# Patient Record
Sex: FEMALE | Race: BLACK OR AFRICAN AMERICAN | URBAN - METROPOLITAN AREA
[De-identification: names, ages, dates, MRNs, and addresses within clinical notes are randomized per-mention and may not be internally consistent; named-entity substitution may affect disease eponyms.]

---

## 2017-11-28 PROBLEM — 266435005 GASTRO-ESOPHAGEAL REFLUX DISEASE WITHOUT ESOPHAGITIS: Status: ACTIVE | Noted: 2017-11-28

## 2020-10-13 ENCOUNTER — OFFICE VISIT (OUTPATIENT)
Dept: URBAN - METROPOLITAN AREA CLINIC 29 | Facility: CLINIC | Age: 55
End: 2020-10-13

## 2020-12-01 ENCOUNTER — OFFICE VISIT (OUTPATIENT)
Dept: URBAN - METROPOLITAN AREA CLINIC 29 | Facility: CLINIC | Age: 55
End: 2020-12-01

## 2020-12-01 PROBLEM — 88111009 CHANGE IN BOWEL HABIT: Status: ACTIVE | Noted: 2020-12-01

## 2020-12-01 PROBLEM — 238131007 OVERWEIGHT: Status: ACTIVE | Noted: 2020-12-01

## 2020-12-01 PROBLEM — 14760008 CONSTIPATION: Status: ACTIVE | Noted: 2020-12-01

## 2020-12-18 ENCOUNTER — OFFICE VISIT (OUTPATIENT)
Dept: URBAN - METROPOLITAN AREA SURGERY CENTER 7 | Facility: SURGERY CENTER | Age: 55
End: 2020-12-18

## 2021-02-08 ENCOUNTER — OFFICE VISIT (OUTPATIENT)
Dept: URBAN - METROPOLITAN AREA MEDICAL CENTER 8 | Facility: MEDICAL CENTER | Age: 56
End: 2021-02-08

## 2022-04-30 ENCOUNTER — TELEPHONE ENCOUNTER (OUTPATIENT)
Dept: URBAN - METROPOLITAN AREA CLINIC 121 | Facility: CLINIC | Age: 57
End: 2022-04-30

## 2022-04-30 RX ORDER — LEVOTHYROXINE SODIUM 75 UG/1
TABLET ORAL
OUTPATIENT
Start: 2016-11-09

## 2022-04-30 RX ORDER — LEVOTHYROXINE SODIUM 75 UG/1
TABLET ORAL
OUTPATIENT
Start: 2016-11-09 | End: 2020-12-01

## 2022-05-01 ENCOUNTER — TELEPHONE ENCOUNTER (OUTPATIENT)
Dept: URBAN - METROPOLITAN AREA CLINIC 121 | Facility: CLINIC | Age: 57
End: 2022-05-01

## 2022-05-01 RX ORDER — ERGOCALCIFEROL (VITAMIN D2) 10 MCG
TABLET ORAL
Status: ACTIVE | COMMUNITY
Start: 2016-11-09

## 2022-05-01 RX ORDER — LEVOTHYROXINE SODIUM 88 UG/1
TABLET ORAL
Status: ACTIVE | COMMUNITY
Start: 2020-12-01

## 2022-05-01 RX ORDER — SODIUM SULFATE, POTASSIUM SULFATE, MAGNESIUM SULFATE 17.5; 3.13; 1.6 G/ML; G/ML; G/ML
MIX AND USE AS DIRECTED. MAY SUB FOR GOLYTELY SOLUTION, CONCENTRATE ORAL
Status: ACTIVE | COMMUNITY
Start: 2016-11-09

## 2022-05-01 RX ORDER — LANSOPRAZOLE 30 MG/1
TAKE 1CAPSULE PO TWICE A DAY CAPSULE, DELAYED RELEASE ORAL
Status: ACTIVE | COMMUNITY
Start: 2017-11-28

## 2022-05-01 RX ORDER — SODIUM SULFATE, POTASSIUM SULFATE, MAGNESIUM SULFATE 17.5; 3.13; 1.6 G/ML; G/ML; G/ML
MIX AND USE AS DIRECTED. MAY SUB FOR GOLYTELY SOLUTION, CONCENTRATE ORAL
Status: ACTIVE | COMMUNITY
Start: 2020-12-01

## 2022-05-01 RX ORDER — TRIAMTERENE AND HYDROCHLOROTHIAZIDE 37.5; 25 MG/1; MG/1
CAPSULE ORAL
Status: ACTIVE | COMMUNITY
Start: 2016-11-09

## 2022-05-01 RX ORDER — ATENOLOL 25 MG/1
TABLET ORAL
Status: ACTIVE | COMMUNITY
Start: 2016-11-09

## 2022-05-01 RX ORDER — PROPRANOLOL HYDROCHLORIDE 80 MG/1
TABLET ORAL
Status: ACTIVE | COMMUNITY
Start: 2020-12-01

## 2022-05-01 RX ORDER — ATORVASTATIN CALCIUM 20 MG/1
TABLET, FILM COATED ORAL
Status: ACTIVE | COMMUNITY
Start: 2016-11-09

## 2022-05-01 RX ORDER — LINACLOTIDE 290 UG/1
1 CAPSULE PO QAM 30 MINUTES BEFORE FIRST MEAL CAPSULE, GELATIN COATED ORAL
Status: ACTIVE | COMMUNITY
Start: 2020-12-23

## 2022-05-01 RX ORDER — GABAPENTIN 600 MG/1
TABLET ORAL
Status: ACTIVE | COMMUNITY
Start: 2020-12-01

## 2022-05-01 RX ORDER — METFORMIN HYDROCHLORIDE 500 MG/1
TABLET, EXTENDED RELEASE ORAL
Status: ACTIVE | COMMUNITY
Start: 2016-11-09

## 2022-05-01 RX ORDER — TRIAMTERENE AND HYDROCHLOROTHIAZIDE 37.5; 25 MG/1; MG/1
TABLET ORAL
Status: ACTIVE | COMMUNITY
Start: 2020-12-01

## 2023-01-19 ENCOUNTER — OFFICE VISIT (OUTPATIENT)
Dept: URBAN - METROPOLITAN AREA CLINIC 29 | Facility: CLINIC | Age: 58
End: 2023-01-19
Payer: COMMERCIAL

## 2023-01-19 ENCOUNTER — DASHBOARD ENCOUNTERS (OUTPATIENT)
Age: 58
End: 2023-01-19

## 2023-01-19 ENCOUNTER — WEB ENCOUNTER (OUTPATIENT)
Dept: URBAN - METROPOLITAN AREA CLINIC 29 | Facility: CLINIC | Age: 58
End: 2023-01-19

## 2023-01-19 VITALS
DIASTOLIC BLOOD PRESSURE: 90 MMHG | SYSTOLIC BLOOD PRESSURE: 136 MMHG | HEIGHT: 66 IN | BODY MASS INDEX: 26.9 KG/M2 | WEIGHT: 167.4 LBS | HEART RATE: 70 BPM

## 2023-01-19 DIAGNOSIS — K64.4 ANAL SKIN TAG: ICD-10-CM

## 2023-01-19 PROCEDURE — 99213 OFFICE O/P EST LOW 20 MIN: CPT | Performed by: INTERNAL MEDICINE

## 2023-01-19 RX ORDER — PROPRANOLOL HYDROCHLORIDE 80 MG/1
TABLET ORAL
Status: ACTIVE | COMMUNITY
Start: 2020-12-01

## 2023-01-19 RX ORDER — SODIUM SULFATE, POTASSIUM SULFATE, MAGNESIUM SULFATE 17.5; 3.13; 1.6 G/ML; G/ML; G/ML
MIX AND USE AS DIRECTED. MAY SUB FOR GOLYTELY SOLUTION, CONCENTRATE ORAL
Status: ACTIVE | COMMUNITY
Start: 2016-11-09

## 2023-01-19 RX ORDER — ERGOCALCIFEROL (VITAMIN D2) 10 MCG
TABLET ORAL
Status: ACTIVE | COMMUNITY
Start: 2016-11-09

## 2023-01-19 RX ORDER — ATENOLOL 25 MG/1
TABLET ORAL
Status: ACTIVE | COMMUNITY
Start: 2016-11-09

## 2023-01-19 RX ORDER — ATORVASTATIN CALCIUM 20 MG/1
TABLET, FILM COATED ORAL
Status: ACTIVE | COMMUNITY
Start: 2016-11-09

## 2023-01-19 RX ORDER — METFORMIN HYDROCHLORIDE 500 MG/1
TABLET, EXTENDED RELEASE ORAL
Status: ACTIVE | COMMUNITY
Start: 2016-11-09

## 2023-01-19 RX ORDER — TRIAMTERENE AND HYDROCHLOROTHIAZIDE 37.5; 25 MG/1; MG/1
CAPSULE ORAL
Status: ACTIVE | COMMUNITY
Start: 2016-11-09

## 2023-01-19 RX ORDER — PHENTERMINE HYDROCHLORIDE 37.5 MG/1
CAPSULE ORAL
Qty: 0 | Refills: 0 | Status: ACTIVE | COMMUNITY
Start: 1900-01-01 | End: 1900-01-01

## 2023-01-19 RX ORDER — GABAPENTIN 600 MG/1
TABLET ORAL
Status: ACTIVE | COMMUNITY
Start: 2020-12-01

## 2023-01-19 RX ORDER — TRIAMTERENE AND HYDROCHLOROTHIAZIDE 37.5; 25 MG/1; MG/1
TABLET ORAL
Status: ACTIVE | COMMUNITY
Start: 2020-12-01

## 2023-01-19 RX ORDER — LEVOTHYROXINE SODIUM 88 UG/1
TABLET ORAL
Status: ACTIVE | COMMUNITY
Start: 2020-12-01

## 2023-01-19 RX ORDER — ERGOCALCIFEROL 1.25 MG/1
CAPSULE ORAL
Qty: 0 | Refills: 0 | Status: ACTIVE | COMMUNITY
Start: 1900-01-01 | End: 1900-01-01

## 2023-01-19 RX ORDER — METFORMIN HYDROCHLORIDE 500 MG/1
TABLET, COATED ORAL
Qty: 0 | Refills: 0 | Status: ACTIVE | COMMUNITY
Start: 1900-01-01 | End: 1900-01-01

## 2023-01-19 RX ORDER — LANSOPRAZOLE 30 MG/1
TAKE 1CAPSULE PO TWICE A DAY CAPSULE, DELAYED RELEASE ORAL
Status: ACTIVE | COMMUNITY
Start: 2017-11-28

## 2023-01-19 RX ORDER — LINACLOTIDE 290 UG/1
1 CAPSULE PO QAM 30 MINUTES BEFORE FIRST MEAL CAPSULE, GELATIN COATED ORAL
Status: ACTIVE | COMMUNITY
Start: 2020-12-23

## 2023-01-19 NOTE — HPI-TODAY'S VISIT:
Mrs. Rose presents today with complaint of rectal bleeding and perirectal irritation over the past month.  She has tried some topical hemorrhoid creams with no improvement.  Last colonoscopy in February 2021 was only notable for anal skin tags.  Otherwise, she has no other GI complaints.

## 2024-11-08 ENCOUNTER — OFFICE VISIT (OUTPATIENT)
Dept: URBAN - METROPOLITAN AREA CLINIC 29 | Facility: CLINIC | Age: 59
End: 2024-11-08
Payer: COMMERCIAL

## 2024-11-08 VITALS
SYSTOLIC BLOOD PRESSURE: 107 MMHG | BODY MASS INDEX: 23.46 KG/M2 | WEIGHT: 146 LBS | HEIGHT: 66 IN | DIASTOLIC BLOOD PRESSURE: 71 MMHG | HEART RATE: 72 BPM

## 2024-11-08 DIAGNOSIS — E11.9 TYPE 2 DIABETES MELLITUS WITHOUT COMPLICATIONS: ICD-10-CM

## 2024-11-08 DIAGNOSIS — I10 ESSENTIAL (PRIMARY) HYPERTENSION: ICD-10-CM

## 2024-11-08 DIAGNOSIS — K21.9 GASTRO-ESOPHAGEAL REFLUX DISEASE WITHOUT ESOPHAGITIS: ICD-10-CM

## 2024-11-08 PROCEDURE — 99214 OFFICE O/P EST MOD 30 MIN: CPT | Performed by: INTERNAL MEDICINE

## 2024-11-08 RX ORDER — PROPRANOLOL HYDROCHLORIDE 80 MG/1
TABLET ORAL
Status: DISCONTINUED | COMMUNITY
Start: 2020-12-01

## 2024-11-08 RX ORDER — ATORVASTATIN CALCIUM 20 MG/1
TABLET, FILM COATED ORAL
Status: ACTIVE | COMMUNITY
Start: 2016-11-09

## 2024-11-08 RX ORDER — ATENOLOL 25 MG/1
TABLET ORAL
Status: DISCONTINUED | COMMUNITY
Start: 2016-11-09

## 2024-11-08 RX ORDER — LEVOTHYROXINE SODIUM 88 UG/1
TABLET ORAL
Status: ACTIVE | COMMUNITY
Start: 2020-12-01

## 2024-11-08 RX ORDER — VONOPRAZAN FUMARATE 26.72 MG/1
1 TABLET TABLET ORAL ONCE A DAY
Qty: 90 TABLET | Refills: 3 | OUTPATIENT
Start: 2024-11-09 | End: 2025-11-04

## 2024-11-08 RX ORDER — ERGOCALCIFEROL (VITAMIN D2) 10 MCG
TABLET ORAL
Status: ACTIVE | COMMUNITY
Start: 2016-11-09

## 2024-11-08 RX ORDER — SODIUM SULFATE, POTASSIUM SULFATE, MAGNESIUM SULFATE 17.5; 3.13; 1.6 G/ML; G/ML; G/ML
MIX AND USE AS DIRECTED. MAY SUB FOR GOLYTELY SOLUTION, CONCENTRATE ORAL
Status: DISCONTINUED | COMMUNITY
Start: 2016-11-09

## 2024-11-08 RX ORDER — GABAPENTIN 600 MG/1
TABLET ORAL
Status: ACTIVE | COMMUNITY
Start: 2020-12-01

## 2024-11-08 RX ORDER — PHENTERMINE HYDROCHLORIDE 37.5 MG/1
CAPSULE ORAL
Qty: 0 | Refills: 0 | Status: DISCONTINUED | COMMUNITY
Start: 1900-01-01

## 2024-11-08 RX ORDER — METFORMIN HYDROCHLORIDE 500 MG/1
TABLET, EXTENDED RELEASE ORAL
Status: DISCONTINUED | COMMUNITY
Start: 2016-11-09

## 2024-11-08 RX ORDER — TRIAMTERENE AND HYDROCHLOROTHIAZIDE 37.5; 25 MG/1; MG/1
CAPSULE ORAL
Status: ACTIVE | COMMUNITY
Start: 2016-11-09

## 2024-11-08 RX ORDER — LANSOPRAZOLE 30 MG/1
TAKE 1CAPSULE PO TWICE A DAY CAPSULE, DELAYED RELEASE ORAL
Status: DISCONTINUED | COMMUNITY
Start: 2017-11-28

## 2024-11-08 RX ORDER — METFORMIN HYDROCHLORIDE 500 MG/1
TABLET, COATED ORAL
Qty: 0 | Refills: 0 | Status: DISCONTINUED | COMMUNITY
Start: 1900-01-01

## 2024-11-08 RX ORDER — ERGOCALCIFEROL 1.25 MG/1
CAPSULE ORAL
Qty: 0 | Refills: 0 | Status: ACTIVE | COMMUNITY
Start: 1900-01-01

## 2024-11-08 RX ORDER — TRIAMTERENE AND HYDROCHLOROTHIAZIDE 37.5; 25 MG/1; MG/1
TABLET ORAL
Status: ACTIVE | COMMUNITY
Start: 2020-12-01

## 2024-11-08 RX ORDER — LINACLOTIDE 290 UG/1
1 CAPSULE PO QAM 30 MINUTES BEFORE FIRST MEAL CAPSULE, GELATIN COATED ORAL
Status: DISCONTINUED | COMMUNITY
Start: 2020-12-23

## 2024-11-08 NOTE — HPI-TODAY'S VISIT:
Mrs. Rose presents today per referral by her otolaryngologist Dr. Shea for further management of heartburn. She was recently diagnosed her with laryngal pharyngeal reflux disease.  She had tried some prior PPIs, such as Prilosec with no improvement.  Otherwise, she has no other GI complaints.

## 2024-11-09 ENCOUNTER — LAB OUTSIDE AN ENCOUNTER (OUTPATIENT)
Dept: URBAN - METROPOLITAN AREA CLINIC 29 | Facility: CLINIC | Age: 59
End: 2024-11-09

## 2024-11-18 ENCOUNTER — OFFICE VISIT (OUTPATIENT)
Dept: URBAN - METROPOLITAN AREA SURGERY CENTER 7 | Facility: SURGERY CENTER | Age: 59
End: 2024-11-18
Payer: COMMERCIAL

## 2024-11-18 ENCOUNTER — CLAIMS CREATED FROM THE CLAIM WINDOW (OUTPATIENT)
Dept: URBAN - METROPOLITAN AREA SURGERY CENTER 7 | Facility: SURGERY CENTER | Age: 59
End: 2024-11-18

## 2024-11-18 ENCOUNTER — TELEPHONE ENCOUNTER (OUTPATIENT)
Dept: URBAN - METROPOLITAN AREA CLINIC 27 | Facility: CLINIC | Age: 59
End: 2024-11-18

## 2024-11-18 ENCOUNTER — CLAIMS CREATED FROM THE CLAIM WINDOW (OUTPATIENT)
Dept: URBAN - METROPOLITAN AREA CLINIC 4 | Facility: CLINIC | Age: 59
End: 2024-11-18
Payer: COMMERCIAL

## 2024-11-18 DIAGNOSIS — K21.9 ACID REFLUX: ICD-10-CM

## 2024-11-18 DIAGNOSIS — R12 HEARTBURN: ICD-10-CM

## 2024-11-18 DIAGNOSIS — K21.9 GASTRO-ESOPHAGEAL REFLUX DISEASE WITHOUT ESOPHAGITIS: ICD-10-CM

## 2024-11-18 PROCEDURE — 88305 TISSUE EXAM BY PATHOLOGIST: CPT | Performed by: PATHOLOGY

## 2024-11-18 PROCEDURE — 43239 EGD BIOPSY SINGLE/MULTIPLE: CPT | Performed by: INTERNAL MEDICINE

## 2024-11-18 PROCEDURE — 88312 SPECIAL STAINS GROUP 1: CPT | Performed by: PATHOLOGY

## 2024-11-18 RX ORDER — ATORVASTATIN CALCIUM 20 MG/1
TABLET, FILM COATED ORAL
Status: ACTIVE | COMMUNITY
Start: 2016-11-09

## 2024-11-18 RX ORDER — TRIAMTERENE AND HYDROCHLOROTHIAZIDE 37.5; 25 MG/1; MG/1
CAPSULE ORAL
Status: ACTIVE | COMMUNITY
Start: 2016-11-09

## 2024-11-18 RX ORDER — ERGOCALCIFEROL (VITAMIN D2) 10 MCG
TABLET ORAL
Status: ACTIVE | COMMUNITY
Start: 2016-11-09

## 2024-11-18 RX ORDER — VONOPRAZAN FUMARATE 26.72 MG/1
1 TABLET TABLET ORAL ONCE A DAY
Qty: 90 TABLET | Refills: 3 | Status: ACTIVE | COMMUNITY
Start: 2024-11-09 | End: 2025-11-04

## 2024-11-18 RX ORDER — PANTOPRAZOLE SODIUM 40 MG/1
1 TABLET TABLET, DELAYED RELEASE ORAL TWICE A DAY
Qty: 180 TABLET | Refills: 5 | OUTPATIENT
Start: 2024-11-18

## 2024-11-18 RX ORDER — LEVOTHYROXINE SODIUM 88 UG/1
TABLET ORAL
Status: ACTIVE | COMMUNITY
Start: 2020-12-01

## 2024-11-18 RX ORDER — GABAPENTIN 600 MG/1
TABLET ORAL
Status: ACTIVE | COMMUNITY
Start: 2020-12-01

## 2024-11-18 RX ORDER — TRIAMTERENE AND HYDROCHLOROTHIAZIDE 37.5; 25 MG/1; MG/1
TABLET ORAL
Status: ACTIVE | COMMUNITY
Start: 2020-12-01

## 2024-11-18 RX ORDER — ERGOCALCIFEROL 1.25 MG/1
CAPSULE ORAL
Qty: 0 | Refills: 0 | Status: ACTIVE | COMMUNITY
Start: 1900-01-01

## 2024-12-06 ENCOUNTER — OFFICE VISIT (OUTPATIENT)
Dept: URBAN - METROPOLITAN AREA CLINIC 29 | Facility: CLINIC | Age: 59
End: 2024-12-06
Payer: COMMERCIAL

## 2024-12-06 VITALS
BODY MASS INDEX: 23.78 KG/M2 | HEART RATE: 67 BPM | HEIGHT: 66 IN | WEIGHT: 148 LBS | SYSTOLIC BLOOD PRESSURE: 110 MMHG | DIASTOLIC BLOOD PRESSURE: 69 MMHG

## 2024-12-06 DIAGNOSIS — K21.9 GASTRO-ESOPHAGEAL REFLUX DISEASE WITHOUT ESOPHAGITIS: ICD-10-CM

## 2024-12-06 PROCEDURE — 99214 OFFICE O/P EST MOD 30 MIN: CPT | Performed by: INTERNAL MEDICINE

## 2024-12-06 RX ORDER — TRIAMTERENE AND HYDROCHLOROTHIAZIDE 37.5; 25 MG/1; MG/1
TABLET ORAL
Status: ACTIVE | COMMUNITY
Start: 2020-12-01

## 2024-12-06 RX ORDER — ERGOCALCIFEROL (VITAMIN D2) 10 MCG
TABLET ORAL
Status: ACTIVE | COMMUNITY
Start: 2016-11-09

## 2024-12-06 RX ORDER — ERGOCALCIFEROL 1.25 MG/1
CAPSULE ORAL
Qty: 0 | Refills: 0 | Status: ACTIVE | COMMUNITY
Start: 1900-01-01

## 2024-12-06 RX ORDER — VONOPRAZAN FUMARATE 26.72 MG/1
1 TABLET TABLET ORAL ONCE A DAY
Qty: 90 TABLET | Refills: 3 | Status: ACTIVE | COMMUNITY
Start: 2024-11-09 | End: 2025-11-04

## 2024-12-06 RX ORDER — TRIAMTERENE AND HYDROCHLOROTHIAZIDE 37.5; 25 MG/1; MG/1
CAPSULE ORAL
Status: ACTIVE | COMMUNITY
Start: 2016-11-09

## 2024-12-06 RX ORDER — PANTOPRAZOLE SODIUM 40 MG/1
1 TABLET TABLET, DELAYED RELEASE ORAL TWICE A DAY
Qty: 180 TABLET | Refills: 5 | Status: ACTIVE | COMMUNITY
Start: 2024-11-18

## 2024-12-06 RX ORDER — GABAPENTIN 600 MG/1
TABLET ORAL
Status: ACTIVE | COMMUNITY
Start: 2020-12-01

## 2024-12-06 RX ORDER — LEVOTHYROXINE SODIUM 88 UG/1
TABLET ORAL
Status: ACTIVE | COMMUNITY
Start: 2020-12-01

## 2024-12-06 RX ORDER — ATORVASTATIN CALCIUM 20 MG/1
TABLET, FILM COATED ORAL
Status: ACTIVE | COMMUNITY
Start: 2016-11-09

## 2024-12-06 NOTE — HPI-TODAY'S VISIT:
Mrs. Rose presents today for routine follow-up since her recent endoscopy for reflux disease.  She is currently doing well taking Pantoprazole 40 mg twice daily.  Her endoscopic evaluation was most notable for reflux disease.  Otherwise she has no other GI complaints.

## 2025-02-06 ENCOUNTER — OFFICE VISIT (OUTPATIENT)
Dept: URBAN - METROPOLITAN AREA CLINIC 29 | Facility: CLINIC | Age: 60
End: 2025-02-06
Payer: COMMERCIAL

## 2025-02-06 VITALS
SYSTOLIC BLOOD PRESSURE: 110 MMHG | WEIGHT: 151 LBS | HEART RATE: 62 BPM | HEIGHT: 66 IN | BODY MASS INDEX: 24.27 KG/M2 | DIASTOLIC BLOOD PRESSURE: 74 MMHG

## 2025-02-06 DIAGNOSIS — K62.5 RECTAL BLEEDING: ICD-10-CM

## 2025-02-06 PROCEDURE — 99214 OFFICE O/P EST MOD 30 MIN: CPT | Performed by: INTERNAL MEDICINE

## 2025-02-06 RX ORDER — LEVOTHYROXINE SODIUM 88 UG/1
TABLET ORAL
Status: ACTIVE | COMMUNITY
Start: 2020-12-01

## 2025-02-06 RX ORDER — ATORVASTATIN CALCIUM 20 MG/1
TABLET, FILM COATED ORAL
Status: ACTIVE | COMMUNITY
Start: 2016-11-09

## 2025-02-06 RX ORDER — VONOPRAZAN FUMARATE 26.72 MG/1
1 TABLET TABLET ORAL ONCE A DAY
Qty: 90 TABLET | Refills: 3 | Status: ACTIVE | COMMUNITY
Start: 2024-11-09 | End: 2025-11-04

## 2025-02-06 RX ORDER — GABAPENTIN 600 MG/1
TABLET ORAL
Status: ACTIVE | COMMUNITY
Start: 2020-12-01

## 2025-02-06 RX ORDER — TRIAMTERENE AND HYDROCHLOROTHIAZIDE 37.5; 25 MG/1; MG/1
TABLET ORAL
Status: ACTIVE | COMMUNITY
Start: 2020-12-01

## 2025-02-06 RX ORDER — TRIAMTERENE AND HYDROCHLOROTHIAZIDE 37.5; 25 MG/1; MG/1
CAPSULE ORAL
Status: ACTIVE | COMMUNITY
Start: 2016-11-09

## 2025-02-06 RX ORDER — ERGOCALCIFEROL (VITAMIN D2) 10 MCG
TABLET ORAL
Status: ACTIVE | COMMUNITY
Start: 2016-11-09

## 2025-02-06 RX ORDER — ERGOCALCIFEROL 1.25 MG/1
CAPSULE ORAL
Qty: 0 | Refills: 0 | Status: ACTIVE | COMMUNITY
Start: 1900-01-01

## 2025-02-06 RX ORDER — PANTOPRAZOLE SODIUM 40 MG/1
1 TABLET TABLET, DELAYED RELEASE ORAL TWICE A DAY
Qty: 180 TABLET | Refills: 5 | Status: ACTIVE | COMMUNITY
Start: 2024-11-18

## 2025-02-06 NOTE — HPI-TODAY'S VISIT:
Mrs. Rose presents today with complaints of painless rectal bleeding that she experienced last month.  I performed her colonoscopy in 2021 which was notable for anal skin tags.  She denies any constipation.  She actually reports having surgery for her anal skin tags in 2023.  Otherwise, she has no other GI complaints.  She has a history of colon polyps and is due for surveillance next year.

## 2025-02-07 ENCOUNTER — LAB OUTSIDE AN ENCOUNTER (OUTPATIENT)
Dept: URBAN - METROPOLITAN AREA CLINIC 29 | Facility: CLINIC | Age: 60
End: 2025-02-07

## 2025-02-07 PROBLEM — 12063002: Status: ACTIVE | Noted: 2025-02-07

## 2025-02-13 ENCOUNTER — OFFICE VISIT (OUTPATIENT)
Dept: URBAN - METROPOLITAN AREA CLINIC 29 | Facility: CLINIC | Age: 60
End: 2025-02-13
Payer: COMMERCIAL

## 2025-02-13 VITALS
SYSTOLIC BLOOD PRESSURE: 116 MMHG | BODY MASS INDEX: 23.78 KG/M2 | HEART RATE: 82 BPM | HEIGHT: 66 IN | WEIGHT: 148 LBS | DIASTOLIC BLOOD PRESSURE: 79 MMHG

## 2025-02-13 DIAGNOSIS — K64.8 INTERNAL HEMORRHOIDS: ICD-10-CM

## 2025-02-13 DIAGNOSIS — K64.1 GRADE II HEMORRHOIDS: ICD-10-CM

## 2025-02-13 PROBLEM — 90458007: Status: ACTIVE | Noted: 2025-02-13

## 2025-02-13 PROCEDURE — 99213 OFFICE O/P EST LOW 20 MIN: CPT | Performed by: INTERNAL MEDICINE

## 2025-02-13 PROCEDURE — 46221 LIGATION OF HEMORRHOID(S): CPT | Performed by: INTERNAL MEDICINE

## 2025-02-13 RX ORDER — PANTOPRAZOLE SODIUM 40 MG/1
1 TABLET TABLET, DELAYED RELEASE ORAL TWICE A DAY
Qty: 180 TABLET | Refills: 5 | Status: ACTIVE | COMMUNITY
Start: 2024-11-18

## 2025-02-13 RX ORDER — TRIAMTERENE AND HYDROCHLOROTHIAZIDE 37.5; 25 MG/1; MG/1
TABLET ORAL
Status: ACTIVE | COMMUNITY
Start: 2020-12-01

## 2025-02-13 RX ORDER — GABAPENTIN 600 MG/1
TABLET ORAL
Status: ACTIVE | COMMUNITY
Start: 2020-12-01

## 2025-02-13 RX ORDER — VONOPRAZAN FUMARATE 26.72 MG/1
1 TABLET TABLET ORAL ONCE A DAY
Qty: 90 TABLET | Refills: 3 | Status: ACTIVE | COMMUNITY
Start: 2024-11-09 | End: 2025-11-04

## 2025-02-13 RX ORDER — ERGOCALCIFEROL 1.25 MG/1
CAPSULE ORAL
Qty: 0 | Refills: 0 | Status: ACTIVE | COMMUNITY
Start: 1900-01-01

## 2025-02-13 RX ORDER — TRIAMTERENE AND HYDROCHLOROTHIAZIDE 37.5; 25 MG/1; MG/1
CAPSULE ORAL
Status: ACTIVE | COMMUNITY
Start: 2016-11-09

## 2025-02-13 RX ORDER — LEVOTHYROXINE SODIUM 88 UG/1
TABLET ORAL
Status: ACTIVE | COMMUNITY
Start: 2020-12-01

## 2025-02-13 RX ORDER — ERGOCALCIFEROL (VITAMIN D2) 10 MCG
TABLET ORAL
Status: ACTIVE | COMMUNITY
Start: 2016-11-09

## 2025-02-13 RX ORDER — ATORVASTATIN CALCIUM 20 MG/1
TABLET, FILM COATED ORAL
Status: ACTIVE | COMMUNITY
Start: 2016-11-09

## 2025-02-13 NOTE — HPI-TODAY'S VISIT:
Mrs. Rose presents today for her initial hemorrhoid banding.  Recently she has had a few episodes of painless rectal bleeding.  Otherwise, she has no other GI complaints.

## 2025-02-13 NOTE — PHYSICAL EXAM RECTAL:
normal tone,  no external hemorrhoids,  no masses palpable,  no red blood, no tenderness on JO,  Internal hemorrhoids present, anal skin tags